# Patient Record
Sex: MALE | Race: BLACK OR AFRICAN AMERICAN | NOT HISPANIC OR LATINO | Employment: FULL TIME | URBAN - METROPOLITAN AREA
[De-identification: names, ages, dates, MRNs, and addresses within clinical notes are randomized per-mention and may not be internally consistent; named-entity substitution may affect disease eponyms.]

---
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
## 2021-05-04 ENCOUNTER — HOSPITAL ENCOUNTER (EMERGENCY)
Facility: HOSPITAL | Age: 21
Discharge: HOME | End: 2021-05-04
Payer: MEDICAID

## 2021-05-04 VITALS
OXYGEN SATURATION: 100 % | SYSTOLIC BLOOD PRESSURE: 147 MMHG | DIASTOLIC BLOOD PRESSURE: 86 MMHG | HEART RATE: 89 BPM | WEIGHT: 276.88 LBS | RESPIRATION RATE: 20 BRPM | TEMPERATURE: 98 F
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
## 2021-05-04 DIAGNOSIS — T14.8XXA ANIMAL BITE: Primary | ICD-10-CM

## 2021-05-04 DIAGNOSIS — S61.452A CAT BITE OF LEFT HAND, INITIAL ENCOUNTER: ICD-10-CM

## 2021-05-04 PROCEDURE — 99284 EMERGENCY DEPT VISIT MOD MDM: CPT | Mod: 25

## 2021-05-04 PROCEDURE — 90715 TDAP VACCINE 7 YRS/> IM: CPT | Performed by: NURSE PRACTITIONER

## 2021-05-04 PROCEDURE — 63600175 PHARM REV CODE 636 W HCPCS: Performed by: NURSE PRACTITIONER

## 2021-05-04 PROCEDURE — 25000003 PHARM REV CODE 250: Performed by: NURSE PRACTITIONER

## 2021-05-04 PROCEDURE — 90471 IMMUNIZATION ADMIN: CPT | Performed by: NURSE PRACTITIONER

## 2021-05-04 RX ORDER — AMOXICILLIN AND CLAVULANATE POTASSIUM 875; 125 MG/1; MG/1
1 TABLET, FILM COATED ORAL 2 TIMES DAILY
Qty: 14 TABLET | Refills: 0 | Status: SHIPPED | OUTPATIENT
Start: 2021-05-04

## 2021-05-04 RX ORDER — MUPIROCIN 20 MG/G
OINTMENT TOPICAL 3 TIMES DAILY
Qty: 15 G | Refills: 0 | Status: SHIPPED | OUTPATIENT
Start: 2021-05-04

## 2021-05-04 RX ORDER — MUPIROCIN 20 MG/G
1 OINTMENT TOPICAL
Status: COMPLETED | OUTPATIENT
Start: 2021-05-04 | End: 2021-05-04

## 2021-05-04 RX ADMIN — MUPIROCIN 22 G: 20 OINTMENT TOPICAL at 03:05

## 2021-05-04 RX ADMIN — CLOSTRIDIUM TETANI TOXOID ANTIGEN (FORMALDEHYDE INACTIVATED), CORYNEBACTERIUM DIPHTHERIAE TOXOID ANTIGEN (FORMALDEHYDE INACTIVATED), BORDETELLA PERTUSSIS TOXOID ANTIGEN (GLUTARALDEHYDE INACTIVATED), BORDETELLA PERTUSSIS FILAMENTOUS HEMAGGLUTININ ANTIGEN (FORMALDEHYDE INACTIVATED), BORDETELLA PERTUSSIS PERTACTIN ANTIGEN, AND BORDETELLA PERTUSSIS FIMBRIAE 2/3 ANTIGEN 0.5 ML: 5; 2; 2.5; 5; 3; 5 INJECTION, SUSPENSION INTRAMUSCULAR at 03:05

## 2021-05-23 ENCOUNTER — HOSPITAL ENCOUNTER (EMERGENCY)
Facility: HOSPITAL | Age: 21
Discharge: HOME | End: 2021-05-23
Attending: EMERGENCY MEDICINE
Payer: MEDICAID

## 2021-05-23 VITALS
WEIGHT: 285.5 LBS | SYSTOLIC BLOOD PRESSURE: 140 MMHG | OXYGEN SATURATION: 99 % | DIASTOLIC BLOOD PRESSURE: 85 MMHG | RESPIRATION RATE: 18 BRPM | HEART RATE: 80 BPM | TEMPERATURE: 99 F

## 2021-05-23 DIAGNOSIS — U07.1 COVID-19 VIRUS INFECTION: Primary | ICD-10-CM

## 2021-05-23 LAB — SARS-COV-2 RDRP RESP QL NAA+PROBE: POSITIVE

## 2021-05-23 PROCEDURE — U0002 COVID-19 LAB TEST NON-CDC: HCPCS | Performed by: EMERGENCY MEDICINE

## 2021-05-23 PROCEDURE — 99283 EMERGENCY DEPT VISIT LOW MDM: CPT | Mod: 25

## 2021-05-24 DIAGNOSIS — U07.1 COVID-19 VIRUS DETECTED: ICD-10-CM

## 2022-09-21 ENCOUNTER — HOSPITAL ENCOUNTER (EMERGENCY)
Facility: HOSPITAL | Age: 22
Discharge: HOME | End: 2022-09-21
Attending: STUDENT IN AN ORGANIZED HEALTH CARE EDUCATION/TRAINING PROGRAM
Payer: MEDICAID

## 2022-09-21 VITALS
WEIGHT: 287.38 LBS | DIASTOLIC BLOOD PRESSURE: 87 MMHG | RESPIRATION RATE: 18 BRPM | SYSTOLIC BLOOD PRESSURE: 142 MMHG | HEART RATE: 88 BPM | TEMPERATURE: 98 F | OXYGEN SATURATION: 98 %

## 2022-09-21 DIAGNOSIS — L03.311 CELLULITIS OF ABDOMINAL WALL: ICD-10-CM

## 2022-09-21 DIAGNOSIS — S30.861A INSECT BITE OF ABDOMINAL WALL, INITIAL ENCOUNTER: Primary | ICD-10-CM
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
## 2022-09-21 PROCEDURE — 99283 EMERGENCY DEPT VISIT LOW MDM: CPT

## 2022-09-21 RX ORDER — CEPHALEXIN 500 MG/1
500 CAPSULE ORAL 4 TIMES DAILY
Qty: 20 CAPSULE | Refills: 0 | Status: SHIPPED | OUTPATIENT
Start: 2022-09-21 | End: 2022-09-26

## 2022-09-21 NOTE — ED NOTES
MD reviewed results and DC instructions with pt. Pt notified that prescription for Keflex was sent to pharmacy on file. Pt verbalized understanding. AVS printed but pt left prior to receiving it.

## 2022-09-23 NOTE — ED PROVIDER NOTES
"[facility]                                                  Chief Complaint     Chief Complaint   Patient presents with    Insect Bite     Pt c/c of insect bite 2 days ago.  Pt states he saw a spider in the area he was and felt something bite him a while after.  Red area noted lateral to umbilicus,  less than 1" diameter, some induration noted, not warm to touch.  Denies pain on palpation, reports some itching at times.         History of Present Illness  22 y.o. male with PMHx of GERD who presents with insect bite to the abdomen x 2 days. Patient states that he was bit by an insect 2 days ago while lying in bed. He states that he did not exactly see what type of insect bit him.  Denies fever, chills, malaise, headache, nausea or vomiting.    History obtained from:  Patient    Review of patient's allergies indicates:  No Known Allergies  Past Medical History:   Diagnosis Date    Gastritis     GERD (gastroesophageal reflux disease)      No past surgical history on file.   No family history on file.  Social History     Tobacco Use    Smoking status: Never    Smokeless tobacco: Never   Substance Use Topics    Alcohol use: No    Drug use: No          Review of Systems and Physical Exam     Review of Systems      + Color change    All other symptoms negative as stated below    --Constitutional - Denies fever, appetite change, chills  --Eyes - Denies eye discharge, eye pain, eye redness or visual disturbance  --HENT- Denies congestion, sore throat, drooling, ear discharge, rhinorrhea or trouble swallowing  --Respiratory - Denies cough, shortness of breath, wheezing  --Cardiovascular - Denies chest pain, leg swelling, palpitations  --Gastrointestinal - Denies abdominal pain, abdominal distension, constipation, diarrhea, nausea or vomiting  --Genitourinary - Denies difficulty urinating, dysuria, hematuria, urgency  --Musculoskeletal - Denies arthralgias, myalgias  --Neurological - Denies dizziness, headaches, " lightheadedness, weakness  --Hematologic - Denies easy bruising or easy bleeding  --Skin - Denies rash, wound or pallor  --Psychiatric- Denies dysphoric mood, nervousness/anxiety    Vital Signs   weight is 130.3 kg (287 lb 5.9 oz). His oral temperature is 98.4 °F (36.9 °C). His blood pressure is 142/87 (abnormal) and his pulse is 88. His respiration is 18 and oxygen saturation is 98%.      Physical Exam   Nursing note and vitals reviewed  --Constitutional:  Well developed, well nourished. In no acute distress.  --HENT: Normocephalic, atraumatic. No rhinorrhea. Moist oral mucosa. No oropharyngeal edema, erythema or exudates.   --Eyes: PERRL. Extraocular movements intact. No periorbital swelling. Normal conjunctiva.  --Neck: Normal range of motion. Neck supple. No adenopathy  --Cardiac: Regular rhythm, normal S1, normal S2, no murmur, normal rate, intact distal pulses  --Pulmonary: Normal respiratory effort, breath sounds normal and equal bilaterally, no accessory muscle use, no respiratory distress  --Abdominal: Soft, normal bowel sounds, no tenderness, no guarding, no rebound  --Musculoskeletal: Normal range of motion. No deformity, tenderness or edema.  --Neurological:  Alert and oriented x 4. Follows commands appropriately.    --Skin: Mild erythema noted near the umbilicus. Warm and dry. No rash, pallor, cyanosis or jaundice.  --Psych: Normal mood    ED Course       Lab Results (reviewed by me)  Labs Reviewed - No data to display    Radiology (images visualized & reports reviewed by me)  No orders to display       Medications Given  Medications - No data to display    Differential Diagnosis:  Insect bite, Cellulitis, Lymphangitis, Abscess, Erysipelas     ED Management     weight is 130.3 kg (287 lb 5.9 oz). His oral temperature is 98.4 °F (36.9 °C). His blood pressure is 142/87 (abnormal) and his pulse is 88. His respiration is 18 and oxygen saturation is 98%.     Physical exam with mild erythema noted near the  umbilicus, consistent with localized cellulitis secondary to insect bite. Patient deemed stable for discharge. Prescription for keflex provided. Patient to follow up with primary care in 1-2 days.  Strict return precautions given. Understanding of the plan was expressed and all questions were answered.      Diagnosis  The primary encounter diagnosis was Insect bite of abdominal wall, initial encounter. A diagnosis of Cellulitis of abdominal wall was also pertinent to this visit.    Disposition and Plan  Condition: Stable  Disposition: Discharge    ED Prescriptions       Medication Sig Dispense Start Date End Date Auth. Provider    cephALEXin (KEFLEX) 500 MG capsule Take 1 capsule (500 mg total) by mouth 4 (four) times daily. for 5 days 20 capsule 9/21/2022 9/26/2022 Delfino Cruz MD          Follow-up Information       Follow up With Specialties Details Why Contact Info    [facility] - Emergency Dept Emergency Medicine Go to  As needed, If symptoms worsen [address] [facility] [address]  [phone]    Alyssa Almonte MD Internal Medicine Schedule an appointment as soon as possible for a visit in 2 days For follow-up of your ED visit [address] [facility] [address]  [phone]                     Delfino Cruz MD  09/24/22 0217

## 2024-02-28 ENCOUNTER — HOSPITAL ENCOUNTER (EMERGENCY)
Facility: HOSPITAL | Age: 24
Discharge: HOME | End: 2024-02-28
Attending: STUDENT IN AN ORGANIZED HEALTH CARE EDUCATION/TRAINING PROGRAM
Payer: MEDICAID

## 2024-02-28 VITALS
WEIGHT: 255 LBS | DIASTOLIC BLOOD PRESSURE: 64 MMHG | SYSTOLIC BLOOD PRESSURE: 137 MMHG | OXYGEN SATURATION: 100 % | TEMPERATURE: 98 F | RESPIRATION RATE: 18 BRPM | BODY MASS INDEX: 38.65 KG/M2 | HEIGHT: 68 IN | HEART RATE: 84 BPM

## 2024-02-28 DIAGNOSIS — M25.572 ANKLE PAIN, LEFT: ICD-10-CM

## 2024-02-28 DIAGNOSIS — S99.912A INJURY OF LEFT ANKLE, INITIAL ENCOUNTER: Primary | ICD-10-CM

## 2024-02-28 PROCEDURE — 99283 EMERGENCY DEPT VISIT LOW MDM: CPT | Mod: 25

## 2024-02-28 RX ORDER — IBUPROFEN 800 MG/1
800 TABLET ORAL 3 TIMES DAILY PRN
Qty: 21 TABLET | Refills: 0 | Status: SHIPPED | OUTPATIENT
Start: 2024-02-28

## 2024-02-28 NOTE — Clinical Note
unique accompanied their stepmother to the emergency department on 2/28/2024. They may return to work on 01/30/2024.      If you have any questions or concerns, please don't hesitate to call.       RN

## 2024-02-28 NOTE — Clinical Note
"Tirso Jang" Kylah was seen and treated in our emergency department on 2/28/2024.  He may return to work on 02/14/2024.       If you have any questions or concerns, please don't hesitate to call.       RN    "

## 2024-02-28 NOTE — Clinical Note
"Tirso Jang" Kylah was seen and treated in our emergency department on 2/28/2024.  He may return to work on 03/04/2024.       If you have any questions or concerns, please don't hesitate to call.       RN    "

## 2024-02-28 NOTE — Clinical Note
"Tirso Frankeluel" Kylah was seen and treated in our emergency department on 2/28/2024.  He may return to work on 03/04/2024.       If you have any questions or concerns, please don't hesitate to call.      Sheryl CARTER RN    "

## 2024-02-29 NOTE — ED PROVIDER NOTES
Encounter Date: 2/28/2024       History     Chief Complaint   Patient presents with    Ankle Pain     Pt to ED with c/o right ankle pain after tripping while walking in a parking lot at [facility], Denies hitting head, Denies falling to the ground.      This note is dictated on M*Modal word recognition program.  There are word recognition mistakes and grammatical errors that are occasionally missed on review.     Tirso Montero is a 23 y.o. male presents to ER today complaints of slip and fall today injuring his right ankle.  Patient reports right ankle swelling and pain.  Patient reports he was unable to bear weight on his right ankle due to pain at this current time.  Pain while not moving ankle is 3/10.  Pain while bearing weight on ankle is 9/10.    The history is provided by the patient.     Review of patient's allergies indicates:  No Known Allergies  Past Medical History:   Diagnosis Date    Gastritis     GERD (gastroesophageal reflux disease)      History reviewed. No pertinent surgical history.  History reviewed. No pertinent family history.  Social History     Tobacco Use    Smoking status: Never    Smokeless tobacco: Never   Substance Use Topics    Alcohol use: No    Drug use: No     Review of Systems   Constitutional: Negative.    HENT: Negative.     Eyes: Negative.    Respiratory: Negative.     Cardiovascular: Negative.    Gastrointestinal: Negative.    Endocrine: Negative.    Genitourinary: Negative.    Musculoskeletal:  Positive for arthralgias and joint swelling.   Skin: Negative.    Allergic/Immunologic: Negative.    Neurological: Negative.    Hematological: Negative.    Psychiatric/Behavioral: Negative.         Physical Exam     Initial Vitals [02/28/24 1924]   BP Pulse Resp Temp SpO2   130/75 85 18 98 °F (36.7 °C) 100 %      MAP       --         Physical Exam    Constitutional: He appears well-developed and well-nourished. He is not diaphoretic. No distress.   HENT:   Right Ear: External ear  normal.   Left Ear: External ear normal.   Eyes: Pupils are equal, round, and reactive to light. Right eye exhibits no discharge. Left eye exhibits no discharge.   Neck: Neck supple.   Normal range of motion.  Cardiovascular:  Normal rate.     Exam reveals no friction rub.       No murmur heard.  Pulmonary/Chest: Breath sounds normal. No respiratory distress. He has no wheezes. He has no rhonchi. He has no rales.   Abdominal: Abdomen is soft.   Musculoskeletal:         General: Tenderness and edema present.      Cervical back: Normal range of motion and neck supple.      Comments: Patient has mild anterior swelling to right ankle.  Pedal pulses 2+ to right ankle, capillary refill less than 2 seconds to right foot.  Range of motion to right ankle decreased due to pain response.     Neurological: He is alert and oriented to person, place, and time. GCS score is 15. GCS eye subscore is 4. GCS verbal subscore is 5. GCS motor subscore is 6.   Skin: Capillary refill takes less than 2 seconds. No erythema.   Psychiatric: He has a normal mood and affect. His behavior is normal. Judgment and thought content normal.         ED Course   Procedures  Labs Reviewed - No data to display       Imaging Results              X-Ray Ankle Complete Left (Final result)  Result time 02/28/24 20:02:54      Final result by Ryan Wheatley MD (02/28/24 20:02:54)                   Impression:      As above.      Electronically signed by: Ryan Wheatley  Date:    02/28/2024  Time:    20:02               Narrative:    EXAMINATION:  XR ANKLE COMPLETE 3 VIEW LEFT    CLINICAL HISTORY:  Pain in left ankle and joints of left foot    TECHNIQUE:  AP, lateral and oblique views of the left ankle were performed.    COMPARISON:  None    FINDINGS:  No acute fracture or dislocation.  Joint spaces and ankle mortise are maintained.  Lateral ankle swelling.                                       Medications - No data to display  Medical Decision  Making  Differential diagnosis include ankle fracture, ankle sprain, ankle ligament injury, mechanical fall     X-ray of right ankle reveals no acute fracture or dislocation.  There was some lateral ankle swelling.  Ace wrap applied by nursing staff to right ankle.  Patient provided crutches by nursing staff.  Will treat patient's pain with ibuprofen.  Right lower extremity neurovascularly intact on examination today.  Patient stable at time of discharge in no acute distress.  No life-threatening illnesses were found during ER visit today.  Patient was instructed to follow-up with PCP or other recommended specialist within the next 48-72 hours.  Patient was instructed to return to ER immediately for any worsening or concerning symptoms.  All discharge instructions discussed with patient, and patient agrees to comply with discharge instructions given today.     Amount and/or Complexity of Data Reviewed  Radiology: ordered.    Risk  Prescription drug management.                                      Clinical Impression:  Final diagnoses:  [M25.572] Ankle pain, left  [S99.912A] Injury of left ankle, initial encounter (Primary)          ED Disposition Condition    Discharge Stable          ED Prescriptions       Medication Sig Dispense Start Date End Date Auth. Provider    ibuprofen (ADVIL,MOTRIN) 800 MG tablet Take 1 tablet (800 mg total) by mouth 3 (three) times daily as needed for Pain. 21 tablet 2/28/2024 -- Iftikhar Otero NP          Follow-up Information       Follow up With Specialties Details Why Contact Info    Alyssa Almonte MD Internal Medicine Schedule an appointment as soon as possible for a visit in 3 days  [address] [facility]  [place] [address]  [phone]               Iftikhar Otero NP  02/28/24 2054